# Patient Record
Sex: MALE | Race: WHITE | NOT HISPANIC OR LATINO | Employment: OTHER | URBAN - METROPOLITAN AREA
[De-identification: names, ages, dates, MRNs, and addresses within clinical notes are randomized per-mention and may not be internally consistent; named-entity substitution may affect disease eponyms.]

---

## 2020-11-23 ENCOUNTER — OFFICE VISIT (OUTPATIENT)
Dept: URGENT CARE | Facility: CLINIC | Age: 30
End: 2020-11-23
Payer: COMMERCIAL

## 2020-11-23 VITALS
WEIGHT: 185 LBS | RESPIRATION RATE: 20 BRPM | HEIGHT: 75 IN | HEART RATE: 74 BPM | TEMPERATURE: 97.6 F | SYSTOLIC BLOOD PRESSURE: 128 MMHG | OXYGEN SATURATION: 98 % | DIASTOLIC BLOOD PRESSURE: 74 MMHG | BODY MASS INDEX: 23 KG/M2

## 2020-11-23 DIAGNOSIS — L98.9 LESION OF THUMB: Primary | ICD-10-CM

## 2020-11-23 PROCEDURE — G0381 LEV 2 HOSP TYPE B ED VISIT: HCPCS | Performed by: NURSE PRACTITIONER

## 2020-11-30 DIAGNOSIS — L98.9 SKIN LESION OF HAND: Primary | ICD-10-CM

## 2020-12-02 ENCOUNTER — OFFICE VISIT (OUTPATIENT)
Dept: OBGYN CLINIC | Facility: MEDICAL CENTER | Age: 30
End: 2020-12-02

## 2020-12-02 VITALS — BODY MASS INDEX: 23 KG/M2 | TEMPERATURE: 98.7 F | WEIGHT: 185 LBS | HEIGHT: 75 IN

## 2020-12-02 DIAGNOSIS — L98.0 PYOGENIC GRANULOMA: ICD-10-CM

## 2020-12-02 PROCEDURE — 99244 OFF/OP CNSLTJ NEW/EST MOD 40: CPT | Performed by: ORTHOPAEDIC SURGERY

## 2020-12-02 PROCEDURE — 17250 CHEM CAUT OF GRANLTJ TISSUE: CPT | Performed by: ORTHOPAEDIC SURGERY

## 2020-12-09 ENCOUNTER — OFFICE VISIT (OUTPATIENT)
Dept: OBGYN CLINIC | Facility: MEDICAL CENTER | Age: 30
End: 2020-12-09

## 2020-12-09 VITALS — HEIGHT: 75 IN | BODY MASS INDEX: 23 KG/M2 | TEMPERATURE: 96.9 F | WEIGHT: 185 LBS

## 2020-12-09 DIAGNOSIS — L98.0 PYOGENIC GRANULOMA: Primary | ICD-10-CM

## 2020-12-09 PROCEDURE — 99213 OFFICE O/P EST LOW 20 MIN: CPT | Performed by: ORTHOPAEDIC SURGERY

## 2020-12-09 NOTE — PROGRESS NOTES
Chief Complaint     Right thumb lesion      History of Present Illness     Danielle Cisneros is a 31 y o  RHD male who presents for follow-up evaluation of pyogenic granuloma of the right thumb  He was last seen in regards to this issue on 12/2/2020 at which time he received a silver nitrate treatment  He states that the soft tissue surrounding the granuloma has settled father, leaving the lesion itself more prominent than it was prior to the initial treatment  He has been wearing gloves more regularly at work to avoid irritation of the lesion  He denies any recent numbness, tingling, or mechanical symptoms  He does note that he initially had significant bruising/discoloration of the skin following the initial silver nitrate treatment, but this has also subsided in the past few days  History reviewed  No pertinent past medical history  Past Surgical History:   Procedure Laterality Date    APPENDECTOMY         No Known Allergies    No current outpatient medications on file prior to visit  No current facility-administered medications on file prior to visit  Social History     Tobacco Use    Smoking status: Never Smoker    Smokeless tobacco: Never Used   Substance Use Topics    Alcohol use: Not on file    Drug use: Not on file       History reviewed  No pertinent family history  Review of Systems     As stated in the HPI  All other systems were reviewed and are negative  Physical Exam     Temp (!) 96 9 °F (36 1 °C)   Ht 6' 3" (1 905 m)   Wt 83 9 kg (185 lb)   BMI 23 12 kg/m²     GENERAL: This is a well-developed, well-nourished, age-appropriate patient in no acute distress  The patient is alert and oriented x3  Pleasant and cooperative  Eyes: Anicteric sclerae  Extraocular movements appear intact  HENT: Nares are patent with no drainage  Lungs: There is equal chest rise on inspection  Breathing is non-labored with no audible wheezing  Cardiovascular: No cyanosis   No upper extremity lymphadema  Skin: Skin is warm to touch  No obvious skin lesions or rashes other than described below  Neurologic: No ataxia  Psychiatric: Mood and affect are appropriate  Ortho exam:    Left thumb -   Observable raised circular lesion 1 mm diameter x 1 mm tall  Mild darkening in surrounding skin  Reddened/pink appearance of lesion  Full active passive ROM  5/5 Motor to the APB, FDI, FDP2, FDP5, EDC  Sensation intact to light touch in the median, radial, and ulnar nerve distribution  2+ distal radial pulse brisk capillary refill to fingers      Data Review     Results Reviewed     None         Imaging:  No new imaging reviewed this visit    Assessment and Plan      Diagnoses and all orders for this visit:    Pyogenic granuloma right thumb       Discussed with patient he seems to be improving  Advised patient that he will likely see benefit from an additional treatment with silver nitrate  Patient is amenable to this plan  Silver nitrate application was performed at time of visit without difficulty  Patient is instructed to leave dressing on for 48 hours, and then change dressing daily  Discussed with patient that if he does not see resolution of lesion following this application, then we will consider surgical excision under local anesthesia, preferably before the new year      Follow Up:  1-2 weeks  To Do Next Visit:  Reexamination of current issue    PROCEDURES PERFORMED:  Procedures  No Procedures performed today     Scribe Attestation    I,:  Clemente Spangler am acting as a scribe while in the presence of the attending physician :       I,:  Jesse Bernard MD personally performed the services described in this documentation    as scribed in my presence :

## 2020-12-23 ENCOUNTER — OFFICE VISIT (OUTPATIENT)
Dept: OBGYN CLINIC | Facility: MEDICAL CENTER | Age: 30
End: 2020-12-23

## 2020-12-23 VITALS
HEIGHT: 75 IN | DIASTOLIC BLOOD PRESSURE: 82 MMHG | BODY MASS INDEX: 23 KG/M2 | HEART RATE: 65 BPM | SYSTOLIC BLOOD PRESSURE: 127 MMHG | WEIGHT: 185 LBS | TEMPERATURE: 98.1 F

## 2020-12-23 DIAGNOSIS — L98.0 PYOGENIC GRANULOMA OF SKIN: Primary | ICD-10-CM

## 2020-12-23 PROCEDURE — 99214 OFFICE O/P EST MOD 30 MIN: CPT | Performed by: ORTHOPAEDIC SURGERY

## 2020-12-23 NOTE — H&P (VIEW-ONLY)
Chief Complaint     Lesion of left thumb    History of Present Illness     James Cisneros is a 31 y o  male who presents for follow-up evaluation of pyogenic granuloma of the left thumb middle phalanx  He was last seen in regards to this issue on 12/9/2020 at which time he received his 2nd silver nitrate treatment  He reports that he had darkening of the surrounding skin tissue that persisted for approximately a week and then fell off, but the granuloma did not changed significantly in appearance following 2nd treatment  He also notes that in the last 2-3 days the lesion has become painful and tender to the touch, which was a change from its previous presentation  He denies any associated numbness or tingling  History reviewed  No pertinent past medical history  Past Surgical History:   Procedure Laterality Date    APPENDECTOMY         No Known Allergies    No current outpatient medications on file prior to visit  No current facility-administered medications on file prior to visit  Social History     Tobacco Use    Smoking status: Never Smoker    Smokeless tobacco: Never Used   Substance Use Topics    Alcohol use: Not on file    Drug use: Not on file       History reviewed  No pertinent family history  Review of Systems     As stated in the HPI  All other systems were reviewed and are negative  Physical Exam     /82   Pulse 65   Temp 98 1 °F (36 7 °C)   Ht 6' 3" (1 905 m)   Wt 83 9 kg (185 lb)   BMI 23 12 kg/m²     GENERAL: This is a well-developed, well-nourished, age-appropriate patient in no acute distress  The patient is alert and oriented x3  Pleasant and cooperative  Eyes: Anicteric sclerae  Extraocular movements appear intact  HENT: Nares are patent with no drainage  Lungs: There is equal chest rise on inspection  Breathing is non-labored with no audible wheezing  Cardiovascular: No cyanosis  No upper extremity lymphadema  Skin: Skin is warm to touch   No obvious skin lesions or rashes other than described below  Neurologic: No ataxia  Psychiatric: Mood and affect are appropriate  Ortho exam:  Left thumb -    Raised pedunculated circular lesion on the palmar ulnar aspect near the base of the thumb  About 3 mm x 3 mm  Surrounding skin tissue is moist  Reddened/pink appearance of lesion  Friable tissue at the cap  Full active passive ROM of the thumb  5/5 Motor to the APB, FDI, FDP2, FDP5, EDC  Sensation intact to light touch in the median, radial, and ulnar nerve distribution  2+ distal radial pulse brisk capillary refill to fingers    Data Review     Results Reviewed     None           Imaging:  No new imaging reviewed this visit    Assessment and Plan      Diagnoses and all orders for this visit:    Pyogenic granuloma of skin       · 24-year-old male with pyogenic granuloma of the left thumb refractory to silver nitrate  Discussed that we could excise the mass at this point and if there is any tissue defect, we could perform a full-thickness skin grafting typically taken from the hypothenar eminence  Reviewed procedure, prognosis, benefits and risks with patient at time of visit  Possible recurrence as well as possible neurovascular injury  · Detailed consent was reviewed and obtained at time of visit    · Also discussed sending the mass for pathology to ensure that the mass is a pyogenic granuloma        Follow Up: 10-14 days post op    To Do Next Visit: pos-op evaluation, wound check and skin removal    PROCEDURES PERFORMED:  Procedures  No Procedures performed today     Scribe Attestation    I,:  Naman Leos am acting as a scribe while in the presence of the attending physician :       I,:  Valerie Jean-Baptiste MD personally performed the services described in this documentation    as scribed in my presence :

## 2020-12-29 ENCOUNTER — TELEPHONE (OUTPATIENT)
Dept: OBGYN CLINIC | Facility: HOSPITAL | Age: 30
End: 2020-12-29

## 2020-12-29 NOTE — TELEPHONE ENCOUNTER
Patient sees Dr Radha Hinton  Patient is calling in to get the CPT code for his surgery so he is able to contact  to see how much his surgery is going to cost him, he is statin that he is scheduled for surgery on 12/31 and is asking for a call back as soon as possible        Call back# [phone]

## 2020-12-30 RX ORDER — CHOLECALCIFEROL (VITAMIN D3) 125 MCG
2000 CAPSULE ORAL DAILY
COMMUNITY

## 2020-12-30 RX ORDER — ACETAMINOPHEN 500 MG
500-1000 TABLET ORAL EVERY 6 HOURS PRN
COMMUNITY

## 2020-12-30 NOTE — PRE-PROCEDURE INSTRUCTIONS
Pre-Surgery Instructions:   Medication Instructions    acetaminophen (TYLENOL) 500 mg tablet Instructed patient per Anesthesia Guidelines   Ascorbic Acid (VITAMIN C PO) Instructed patient per Anesthesia Guidelines   Cholecalciferol (Vitamin D3) 50 MCG (2000 UT) TABS Instructed patient per Anesthesia Guidelines   L-Glutathione CARMELINA Instructed patient per Anesthesia Guidelines   NON FORMULARY Instructed patient per Anesthesia Guidelines   Omega-3 Fatty Acids (OMEGA 3 PO) Instructed patient per Anesthesia Guidelines     Instructed to take tylenol if needed am of surgery per anesthesia guidelines

## 2020-12-31 ENCOUNTER — HOSPITAL ENCOUNTER (OUTPATIENT)
Facility: HOSPITAL | Age: 30
Setting detail: OUTPATIENT SURGERY
Discharge: HOME | End: 2020-12-31
Attending: ORTHOPAEDIC SURGERY | Admitting: ORTHOPAEDIC SURGERY

## 2020-12-31 VITALS
BODY MASS INDEX: 23 KG/M2 | SYSTOLIC BLOOD PRESSURE: 127 MMHG | RESPIRATION RATE: 20 BRPM | WEIGHT: 185 LBS | DIASTOLIC BLOOD PRESSURE: 85 MMHG | HEIGHT: 75 IN | HEART RATE: 64 BPM | OXYGEN SATURATION: 99 % | TEMPERATURE: 97.8 F

## 2020-12-31 PROCEDURE — 11420 EXC H-F-NK-SP B9+MARG 0.5/<: CPT | Performed by: PHYSICIAN ASSISTANT

## 2020-12-31 PROCEDURE — 11420 EXC H-F-NK-SP B9+MARG 0.5/<: CPT | Performed by: ORTHOPAEDIC SURGERY

## 2020-12-31 RX ORDER — IBUPROFEN 600 MG/1
600 TABLET ORAL EVERY 6 HOURS PRN
COMMUNITY

## 2020-12-31 RX ORDER — LIDOCAINE HYDROCHLORIDE AND EPINEPHRINE 10; 10 MG/ML; UG/ML
INJECTION, SOLUTION INFILTRATION; PERINEURAL AS NEEDED
Status: DISCONTINUED | OUTPATIENT
Start: 2020-12-31 | End: 2020-12-31 | Stop reason: HOSPADM

## 2020-12-31 RX ORDER — SODIUM CHLORIDE 9 MG/ML
125 INJECTION, SOLUTION INTRAVENOUS CONTINUOUS
Status: DISCONTINUED | OUTPATIENT
Start: 2020-12-31 | End: 2020-12-31 | Stop reason: HOSPADM

## 2020-12-31 RX ORDER — MAGNESIUM HYDROXIDE 1200 MG/15ML
LIQUID ORAL AS NEEDED
Status: DISCONTINUED | OUTPATIENT
Start: 2020-12-31 | End: 2020-12-31 | Stop reason: HOSPADM

## 2020-12-31 NOTE — OP NOTE
DATE OF SURGERY: 12/31/2020    SURGEON: Tiffanie Bryant MD    PREOPERATIVE DIAGNOSIS: Left thumb palmar pyogenic granuloma 0 5x0 5 cm    POSTOPERATIVE DIAGNOSIS:  Same    PROCEDURE:  Excision left thumb pyogenic granuloma 0 5 cm x 0 5 cm    IMPLANTS: * No implants in log *     ASSISTANTS: TIFFANIE Lu    ANESTHESIA: IV Sedation with Anesthesia    ESTIMATED BLOOD LOSS: Minimal     INTRAVENOUS FLUIDS: Per anesthesia    URINE OUTPUT:  No Howell    TOURNIQUET TIME: * No tourniquets in log *      COMPLICATIONS:  None    ANTIBIOTICS:  None    SPECIMENS: * No specimens in log *         INDICATIONS: Levi Cisneros is a 31y o  year old male who sustained the above conditions  The indications for operative intervention were a pyogenic granuloma that was refractory to 2 treatments of silver nitrate  The alternatives to operative intervention included nonoperative care  The risks of the operative procedure were discussed in detail with the patient including but not limited to the risks of anesthesia, infection, injury to blood vessel/nerve, pain, stiffness, changes in sensation, and the need for repeat surgery  We also discussed her risk of recurrence as well  The patient understood these risks and alternatives and elected to proceed with surgery  DESCRIPTION OF PROCEDURE: The patient was seen in the preoperative holding area and identification was performed as per the institution's protocol  10 cc of 1% lidocaine with epinephrine were infused into both the operative site as well as at the hypothenar eminence in anticipation of potentially needing as full-thickness skin graft  The patient was then brought to the operating room, a briefing was held and prophylactic antibiotics were not given  A tourniquet was not used The site was pre-scrubbed with chlorhexidine and prepped with ChloraPrep and draped in the usual sterile fashion         Following a timeout procedure, the top of the granuloma was excised with Nae Grates scissors  The stalk was identified as coming out through the skin  The skin edges were extended proximally and distally about 0 5 cm  Dissection was carried sharply down through skin and subcutaneous tissue taking care to protect any neurovascular structures encountered  The stalk was noted to be emanating from a vein  This was cauterized with bipolar electrocautery  A 2nd look was performed and there was no remaining tissue that appeared granulomatous  The wounds were copiously irrigated and closed in layers including 4-0 nylon  Sterile dressing was applied  All sharps and sponge counts were correct and there were no complications  I attest that I, Clive Lisa, was present and scrubbed for the entirety of the procedure  A physician assistant was required during the procedure for retraction, tissue handling, dissection and suturing  The patient was awoken from anesthesia in good condition and taken to the PACU  PLAN: The patient will keep his dressing on until follow-up    We will see him back at that point and stitches will be removed

## 2020-12-31 NOTE — DISCHARGE INSTRUCTIONS
[facility] Pi[facility] - Dr Nolvia Canela (Orthopedic Surgery)    Follow-up Appointments   Please call to set up/confirm your first postoperative visit with Dr Amirah Nunn in 10-14 days    Dressing and [address] A dressing has been placed on your hand/arm to keep the incisions clean  Keep your dressing clean and dry  o Do not remove the surgical dressing/splint  It will be removed at your first postoperative office visit with the doctor or therapist     Henrik Murillo a plastic bag over your dressing/splint whenever you take a shower or bath until you are allowed to remove it   Swelling is normal after surgery  Elevate your hand/arm so the surgical site is above your heart to decrease the swelling  Swelling is like water, it runs downhill  This is especially important for the first 72 hours after surgery  o The best way to elevate your hand/arm is with your fingers pointing towards the ceiling and your hand/arm above the level of the heart   o You can use pillows to help prop your hand/arm up when sitting or lying down   If you are experiencing pain, be sure you are elevating your hand/arm as often as possible   Apply an ice pack over your dressing/splint for 20 minutes of every hour for the first 3 days when you are awake  This can help to reduce swelling and inflammation  Be sure the ice pack is waterproof so it does not leak on the dressing/splint  A simple ice pack can be made by adding ten cubes and a small amount of water in a small zip-lock bag  Seal this small bag tightly  Place this small bag in a larger zip lock bag  Apply to the area in pain   If the dressing feels too tight in spite of elevation, loosen the outer wrap but do not remove the entire dressing     If you have exposed pins/wires, take clean gauze or a cotton tip applicator (like a Q-tip), get it wet in clean warm water mixed with non-scented hand soap (like Charo, [place], Dial, etc ), and gently wipe around the base of the pin where it comes through the skin once a day  Do not use water from a well to clean as this has bacteria in it and can contribute to infections  ACTIVITIES:  [facility] and straighten the parts of your hand, wrist, elbow, and shoulder that are not included in your surgical dressing or splint  Do this at least 6 times a day, as this will help decrease swelling and speed up your recovery  This includes your fingers  See the instructions listed below for finger motion  THIS IS VERY IMPORTANT FOR YOUR RECOVERY! POSTOPERATIVE CARE/CONCERNS:  Christinalizeth Andrade You may experience some temporary numbness in your fingers   You should have very little to no bleeding on your dressing   Notify the office (see contact info at bottom of page) for any of the following:  o Excessive pain not relieved by rest, elevation, and pain medications  o Feeling that the dressing is too tight in spite of adequately elevating hand/arm  o Active bleeding through the dressing  o Drainage from the wound site or pin sites  o Foul odor from the dressing/wound  o Temperature greater that 101? F or chills  o Blue or excessively cold fingertips  o Numbness of the fingertips that does not improve in spite of adequately elevating hand/arm    PAIN MEDICATION:   Pain is a normal part of the recovery after surgery  The pain medication provided to you will help to decrease the discomfort but will not completely eliminate the pain   A prescription for a narcotic pain medicine (oxycodone or hydrocodone) and anti-inflammatory (naproxen) were called in to your pharmacy  Please take the anti-inflammatory medication AND over-the-counter acetaminophen (Tylenol) regularly  The instructions will be listed on the bottles  The narcotic pain medicine should be used for pain that is not controlled by these other medications and only for the first few days after surgery   The narcotic is HIGHLY ADDICTIVE and has many side effects such as causing constipation, dizziness, confusion, decreased breathing and more  It is safe to take for a short period of time after surgery  It is almost never prescribed for longer than a few weeks and never after one month for elective surgeries   Do not take narcotic or anti-inflammatories on an empty stomach   It is illegal to drive while taking narcotic pain medication   Your pain should decrease over the first few days after surgery which will allow you to take less pain medicine, increase the time between doses of medication, or stop taking all pain medicine        OFFICE CONTACT NUMBERS   Please call [phone] with any questions about appointments or any medical concerns

## 2021-01-04 ENCOUNTER — TELEPHONE (OUTPATIENT)
Dept: OBGYN CLINIC | Facility: HOSPITAL | Age: 31
End: 2021-01-04

## 2021-01-04 NOTE — TELEPHONE ENCOUNTER
I contacted him back and discussed dressing changes as needed  We went through all of the steps for this  He will be moving his appointment to next Thursday  Diego Seals and Matti Yusuf can you please reach out to him to confirm    Appointment on 01/14/2020

## 2021-01-04 NOTE — TELEPHONE ENCOUNTER
Patient was going to move PO to next week per appt line note, however he left it there until call back, states his bandage will not make it till next week

## 2021-01-08 NOTE — TELEPHONE ENCOUNTER
Patient took off and rebandaged l thumb  Observed that one stiatches came off and other two are very loose  Dark red around incision and very center is yellow puss colored but not oozing or bleeding  He took a picture and would like to send it in to see what you think he should do  Please call [phone] to advise    Thank you

## 2021-01-11 NOTE — TELEPHONE ENCOUNTER
I called him back and left a deidentified voicemail with instructions noting that the images showed progressive with wound healing by secondary intention with no evidence of infection  He is to complete daily cleaning in the shower with warm water and gentle soap but no scrubbing  Pat the incision dry  Place a tiny bit of Neosporin in the wound and then cover with gauze and Coban  Can continue to work    We will see him at his postop appointment

## 2021-01-11 NOTE — TELEPHONE ENCOUNTER
Please have him send through either the patient portal or to my patient protected email  Octavio Barney or Monica, can you please coordinate with the patient?

## 2021-01-11 NOTE — TELEPHONE ENCOUNTER
Spoke with patient  He does not have the patient portal, will be sending you the picture to your email shortly  Patient appointment date and time also confirmed

## 2021-01-14 ENCOUNTER — OFFICE VISIT (OUTPATIENT)
Dept: OBGYN CLINIC | Facility: MEDICAL CENTER | Age: 31
End: 2021-01-14

## 2021-01-14 VITALS
WEIGHT: 185 LBS | SYSTOLIC BLOOD PRESSURE: 118 MMHG | HEIGHT: 75 IN | DIASTOLIC BLOOD PRESSURE: 77 MMHG | HEART RATE: 74 BPM | BODY MASS INDEX: 23 KG/M2

## 2021-01-14 DIAGNOSIS — L98.0 PYOGENIC GRANULOMA OF SKIN: Primary | ICD-10-CM

## 2021-01-14 PROCEDURE — 99213 OFFICE O/P EST LOW 20 MIN: CPT | Performed by: ORTHOPAEDIC SURGERY

## 2021-01-14 NOTE — PROGRESS NOTES
Chief Complaint     ***      History of Present Illness     Charito Cisneros is a 31 y o  male ***          Past Medical History:   Diagnosis Date    Mass of skin of left thumb        Past Surgical History:   Procedure Laterality Date    APPENDECTOMY      LA EXC SKIN BENIG <0 5 CM TRUNK,ARM,LEG Left 12/31/2020    Procedure: Thumb mass excision;  Surgeon: Viri Monroy MD;  Location: [facility];  Service: Orthopedics    WISDOM TOOTH EXTRACTION         No Known Allergies    Current Outpatient Medications on File Prior to Visit   Medication Sig Dispense Refill    acetaminophen (TYLENOL) 500 mg tablet Take 500-1,000 mg by mouth every 6 (six) hours as needed for mild pain       Ascorbic Acid (VITAMIN C PO) Take 1,000 mg by mouth daily      Cholecalciferol (Vitamin D3) 50 MCG (2000 UT) TABS Take 2,000 Units by mouth daily      ibuprofen (MOTRIN) 600 mg tablet Take 600 mg by mouth every 6 (six) hours as needed for mild pain      L-Glutathione CARMELINA Take by mouth daily       NON FORMULARY Systemic formula OTC daily      Omega-3 Fatty Acids (OMEGA 3 PO) Take by mouth daily        No current facility-administered medications on file prior to visit  Social History     Tobacco Use    Smoking status: Never Smoker    Smokeless tobacco: Never Used   Substance Use Topics    Alcohol use: Not Currently    Drug use: Not Currently       History reviewed  No pertinent family history  Review of Systems     As stated in the HPI  All other systems were reviewed and are negative  Physical Exam     /77   Pulse 74   Ht 6' 3" (1 905 m)   Wt 83 9 kg (185 lb)   BMI 23 12 kg/m²     GENERAL: This is a well-developed, well-nourished, age-appropriate patient in no acute distress  The patient is alert and oriented x3  Pleasant and cooperative  Eyes: Anicteric sclerae  Extraocular movements appear intact  HENT: Nares are patent with no drainage  Lungs: There is equal chest rise on inspection   Breathing is non-labored with no audible wheezing  Cardiovascular: No cyanosis  No upper extremity lymphadema  Skin: Skin is warm to touch  No obvious skin lesions or rashes other than described below  Neurologic: No ataxia  Psychiatric: Mood and affect are appropriate  Good refill     Data Review     Results Reviewed     None             Imaging:  ***    Assessment and Plan      There are no diagnoses linked to this encounter  Continue soap and water   May coband as needed      Follow Up: ***    To Do Next Visit: ***    PROCEDURES PERFORMED:  Procedures  {Was [facility] done:70414::"No Procedures performed today"}    Scribe Attestation    I,:   am acting as a scribe while in the presence of the attending physician :       I,:   personally performed the services described in this documentation    as scribed in my presence :

## 2021-01-14 NOTE — PROGRESS NOTES
Chief Complaint     Left thumb mass      History of Present Illness     Tegan Cisneros is a 31 y o  male  Presenting for postoperative visit following excision of pyogenic granuloma  He reports that he is doing well  He did have some concern regarding his incision and photos were sent via e-mail  Denies drainage  Has been completing dressing changes and soaks  Past Medical History:   Diagnosis Date    Mass of skin of left thumb        Past Surgical History:   Procedure Laterality Date    APPENDECTOMY      GA EXC SKIN BENIG <0 5 CM TRUNK,ARM,LEG Left 12/31/2020    Procedure: Thumb mass excision;  Surgeon: Brandi Caldwell MD;  Location: [facility];  Service: Orthopedics    WISDOM TOOTH EXTRACTION         No Known Allergies    Current Outpatient Medications on File Prior to Visit   Medication Sig Dispense Refill    acetaminophen (TYLENOL) 500 mg tablet Take 500-1,000 mg by mouth every 6 (six) hours as needed for mild pain       Ascorbic Acid (VITAMIN C PO) Take 1,000 mg by mouth daily      Cholecalciferol (Vitamin D3) 50 MCG (2000 UT) TABS Take 2,000 Units by mouth daily      ibuprofen (MOTRIN) 600 mg tablet Take 600 mg by mouth every 6 (six) hours as needed for mild pain      L-Glutathione CARMELINA Take by mouth daily       NON FORMULARY Systemic formula OTC daily      Omega-3 Fatty Acids (OMEGA 3 PO) Take by mouth daily        No current facility-administered medications on file prior to visit  Social History     Tobacco Use    Smoking status: Never Smoker    Smokeless tobacco: Never Used   Substance Use Topics    Alcohol use: Not Currently    Drug use: Not Currently       History reviewed  No pertinent family history  Review of Systems     As stated in the HPI  All other systems were reviewed and are negative        Physical Exam     /77   Pulse 74   Ht 6' 3" (1 905 m)   Wt 83 9 kg (185 lb)   BMI 23 12 kg/m²     GENERAL: This is a well-developed, well-nourished, age-appropriate patient in no acute distress  The patient is alert and oriented x3  Pleasant and cooperative  Eyes: Anicteric sclerae  Extraocular movements appear intact  HENT: Nares are patent with no drainage  Lungs: There is equal chest rise on inspection  Breathing is non-labored with no audible wheezing  Cardiovascular: No cyanosis  No upper extremity lymphadema  Skin: Skin is warm to touch  No obvious skin lesions or rashes other than described below  Neurologic: No ataxia  Psychiatric: Mood and affect are appropriate  Left thumb:  Incision clean, dry and intact  No fluctuance, purulance or drainage  No swelling, ecchymosis or erythema  Healing eschar at center of wound  Full motion, full opposition  No tenerness  5/5 Motor to the APB, FDI, FDP2, FDP5, EDC  Sensation intact to light touch in the median, radial, and ulnar nerve distribution  Brisk capillary refill noted in all digits  Palpable distal radial pulse            Data Review     Results Reviewed     None             Imaging:  None today    Assessment and Plan      Diagnoses and all orders for this visit:    Pyogenic granuloma of skin             31 y o  male S/P pyogenic granuloma excision  Incision looks improved from photos that were emailed  He may continue to use the hand for normal activities  He may apply Coban to the thumb as needed such as during work  I explained to the patient that there is a very low chance of this granuloma returning although if it does he should certainly follow up  Otherwise he does not need any future appointments        Follow Up: PRN    To Do Next Visit: n/a    PROCEDURES PERFORMED:    No Procedures performed today    Scribe Attestation    I,:  Pierre Alvarez MA am acting as a scribe while in the presence of the attending physician :       I,:  Genaro Daniels MD personally performed the services described in this documentation    as scribed in my presence :

## 2021-03-27 ENCOUNTER — OFFICE VISIT (OUTPATIENT)
Dept: URGENT CARE | Facility: MEDICAL CENTER | Age: 31
End: 2021-03-27

## 2021-03-27 VITALS
SYSTOLIC BLOOD PRESSURE: 133 MMHG | RESPIRATION RATE: 16 BRPM | HEART RATE: 78 BPM | WEIGHT: 187 LBS | DIASTOLIC BLOOD PRESSURE: 86 MMHG | HEIGHT: 74 IN | TEMPERATURE: 97.5 F | OXYGEN SATURATION: 100 % | BODY MASS INDEX: 24 KG/M2

## 2021-03-27 DIAGNOSIS — T14.8XXA SKIN AVULSION: Primary | ICD-10-CM

## 2021-03-27 PROCEDURE — 99213 OFFICE O/P EST LOW 20 MIN: CPT | Performed by: FAMILY MEDICINE

## 2021-03-28 NOTE — PATIENT INSTRUCTIONS
I apply Gelfoam to the right ring finger  Advised patient to change dressing daily  Patient given outpatient prescription for Tdap  Vaccine    Skin Avulsion   WHAT YOU NEED TO KNOW:   Skin avulsion is a wound that happens when skin is torn from your body during an accident or other injury  The torn skin may be lost or too damaged to be repaired, and it must be removed  A wound of this type cannot be stitched closed because there is tissue missing  Avulsion wounds are usually bigger and have more scars because of the missing tissue  DISCHARGE INSTRUCTIONS:   Medicines:   · Antibiotic ointment:  Your healthcare provider may tell you to gently rub a topical antibiotic ointment on your wound  This will help prevent an infection and help your wound heal faster  · Pain medicine: You may be given medicine to take away or decrease pain  Do not wait until the pain is severe before you take your medicine  · NSAIDs , such as ibuprofen, help decrease swelling, pain, and fever  This medicine is available with or without a doctor's order  NSAIDs can cause stomach bleeding or kidney problems in certain people  If you take blood thinner medicine, always ask if NSAIDs are safe for you  Always read the medicine label and follow directions  Do not give these medicines to children under 10months of age without direction from your child's healthcare provider  · Take your medicine as directed  Contact your healthcare provider if you think your medicine is not helping or if you have side effects  Tell him of her if you are allergic to any medicine  Keep a list of the medicines, vitamins, and herbs you take  Include the amounts, and when and why you take them  Bring the list or the pill bottles to follow-up visits  Carry your medicine list with you in case of an emergency  Care for your wound:  Avulsion wounds may take longer to heal because they cannot be closed with tape or stitches   Keep your wound clean and protected to prevent infection and speed healing  · Clean your wound:  Wash your hands with soap and water before and after you care for your wound  You may be able to use a soft cloth to gently clean the wound after the first 24 to 48 hours  After that, gently clean the wound once or twice a day with cool water  Do not soak your wound  Use soap to clean around the wound, but try not to get any on the wound itself  Do not use alcohol or hydrogen peroxide to clean your wound unless you are directed to  Gently pat the area dry and reapply the bandage as directed  · Elevate your wound:  Prop your injured area on pillows to raise it above the level of your heart  This will help reduce pain and swelling  Do this for 30 minutes at a time, as often as you can  · Bandage your wound:  Bandages keep your wound clean, dry, and protected from infection  They may also prevent swelling  Use a bandage that does not stick to your wound, and has a spongy layer to absorb fluids  Leave your bandage on as long as directed  Ask your healthcare provider when and how to change your bandage  Do not wrap the bandage too tightly  This could cut off blood flow and cause more injury  · Use cool compresses:  Wet a washcloth or towel with cool water and hold it on your wound as directed  Ask how often to apply the compress and for how long each time  · Reduce scarring:  Avoid direct sunlight on your wound  Sunlight may burn or change the color of the new skin over your wound  Use sunscreen (SPF 30 or higher) on the new skin for at least 1 year after it heals  Support for leg and arm wounds: You may need to use crutches if the wound is on your leg  You may need to use a sling if the wound is on your arm  Crutches and slings help protect the injured area, prevent further injury, and heal the area in the right position  Follow up with your healthcare provider within 2 days or as directed:   If you have stitches, ask when to return to have them removed  Write down your questions so you remember to ask them during your visits  Contact your healthcare provider if:   · You have new pain, or it gets worse  · You have trouble moving the injured body area  · Your wound splits open or does not seem to be healing  Return to the emergency department if:   · You have a fever  · You have painful swelling, redness, or warmth around your wound  · Your wound is red and there are red streaks on your skin starting at your wound and moving upward  · Your wound is draining pus  · You have heavy bleeding or bleeding that does not stop after 10 minutes of holding firm, direct pressure over the wound  · You feel like there is an object stuck in your wound  © Copyright [address] Information is for End User's use only and may not be sold, redistributed or otherwise used for commercial purposes  All illustrations and images included in CareNotes® are the copyrighted property of A D A M , Inc  or [facility]   The above information is an  only  It is not intended as medical advice for individual conditions or treatments  Talk to your doctor, nurse or pharmacist before following any medical regimen to see if it is safe and effective for you

## 2021-03-28 NOTE — PROGRESS NOTES
[facility]        NAME: Rosana Cisneros is a 31 y o  male  : 1990    MRN: [id number]  DATE: 2021  TIME: 8:30 PM    Assessment and Plan   Skin avulsion [T14  8XXA]  1  Skin avulsion  CANCELED: TDAP Vaccine greater than or equal to 6yo         Patient Instructions       Follow up with PCP in 3-5 days  Proceed to  ER if symptoms worsen  Chief Complaint     Chief Complaint   Patient presents with    Fingernail Avulsion     Patient presents with a right ring finger laceration that happened this afternoon when he was using a new utility knife  He reports that he took off a chunk of his skin rather than slicing it  He is due for TDap, but wants to "shop around" for one  History of Present Illness        29-year-old male here today because he cut his right ring finger with a utility knife approximately 1-2 hours before arriving at urgent care  He was able to clean the wound out apply pressure before arriving  At urgent care  Patient not sure with the last time he received a tetanus shot      Review of Systems   Review of Systems   Skin: Positive for wound           Current Medications       Current Outpatient Medications:     acetaminophen (TYLENOL) 500 mg tablet, Take 500-1,000 mg by mouth every 6 (six) hours as needed for mild pain , Disp: , Rfl:     Ascorbic Acid (VITAMIN C PO), Take 1,000 mg by mouth daily, Disp: , Rfl:     Cholecalciferol (Vitamin D3) 50 MCG (2000 UT) TABS, Take 2,000 Units by mouth daily, Disp: , Rfl:     L-Glutathione CARMELINA, Take by mouth daily , Disp: , Rfl:     NON FORMULARY, Systemic formula OTC daily, Disp: , Rfl:     Omega-3 Fatty Acids (OMEGA 3 PO), Take by mouth daily , Disp: , Rfl:     ibuprofen (MOTRIN) 600 mg tablet, Take 600 mg by mouth every 6 (six) hours as needed for mild pain, Disp: , Rfl:     Current Allergies     Allergies as of 2021    (No Known Allergies)            The following portions of the patient's history were reviewed and updated as appropriate: allergies, current medications, past family history, past medical history, past social history, past surgical history and problem list      Past Medical History:   Diagnosis Date    Mass of skin of left thumb        Past Surgical History:   Procedure Laterality Date    APPENDECTOMY      NC EXC SKIN BENIG <0 5 CM TRUNK,ARM,LEG Left 12/31/2020    Procedure: Thumb mass excision;  Surgeon: Santi Painter MD;  Location: [facility];  Service: Orthopedics    WISDOM TOOTH EXTRACTION         No family history on file  Medications have been verified  Objective   /86   Pulse 78   Temp 97 5 °F (36 4 °C) (Tympanic)   Resp 16   Ht 6' 2" (1 88 m)   Wt 84 8 kg (187 lb)   SpO2 100%   BMI 24 01 kg/m²   No LMP for male patient  Physical Exam     Physical Exam  Vitals signs and nursing note reviewed  Constitutional:       Appearance: Normal appearance  Skin:     Comments: Right ring finger reveals superficial laceration distal lateral aspect it extends into the dermis  Not actively bleeding  A portion of the nail has also been avulsed  Patient otherwise exhibits tactile sensation distal to the injury  Neurological:      Mental Status: He is alert

## 2021-07-26 ENCOUNTER — OFFICE VISIT (OUTPATIENT)
Dept: OBGYN CLINIC | Facility: MEDICAL CENTER | Age: 31
End: 2021-07-26

## 2021-07-26 VITALS
SYSTOLIC BLOOD PRESSURE: 132 MMHG | HEART RATE: 80 BPM | WEIGHT: 190 LBS | BODY MASS INDEX: 24.38 KG/M2 | DIASTOLIC BLOOD PRESSURE: 83 MMHG | HEIGHT: 74 IN

## 2021-07-26 DIAGNOSIS — Z78.9 POOR TOLERANCE FOR AMBULATION: ICD-10-CM

## 2021-07-26 DIAGNOSIS — M25.461 EFFUSION OF RIGHT KNEE: ICD-10-CM

## 2021-07-26 DIAGNOSIS — M23.91 INTERNAL DERANGEMENT OF KNEE JOINT, RIGHT: ICD-10-CM

## 2021-07-26 DIAGNOSIS — Z91.81 STATUS POST FALL: ICD-10-CM

## 2021-07-26 DIAGNOSIS — M70.41 PREPATELLAR BURSITIS OF RIGHT KNEE: Primary | ICD-10-CM

## 2021-07-26 PROCEDURE — 99213 OFFICE O/P EST LOW 20 MIN: CPT | Performed by: ORTHOPAEDIC SURGERY

## 2021-07-26 NOTE — PROGRESS NOTES
Ortho Sports Medicine Knee New Patient Visit     Assesment:   36 y o  male right knee pre-patella bursitis    Plan:    Conservative treatment:    Can use warm compresses     Imaging:    Patient refused x-rays at this time due to being self pay    Injection:    No Injection planned at this time  Patient will return in 1 week for aspiration of pre patella burstis    Surgery:     No surgery is recommended at this point, continue with conservative treatment plan as noted  Follow up:    Return in about 1 week (around 8/2/2021) for Recheck  Chief Complaint   Patient presents with    Pain     History of Present Illness: The patient is a 36 y o  male whose occupation is unknown, referred to me by their primary care physician, seen in clinic for consultation of right knee pain  Pain is located anterior  The patient rates the pain as a 1/10  The pain has been present for a few weeks  The patient sustained an injury in May 2021  The mechanism of injury was While carrying a heavy load he landed directly onto the right knee  Patient states that the knee swelled up immediately then resolved  It more recently has gotten increasing more swollen  He denies any fever, chills, or heat coming from the swelling  The pain is characterized as dull, achy  The pain is present at all times  Patient states that he only has pain when kneeling directly on the right knee  Pain is improved by rest  Pain is aggravated by kneeling  Symptoms include swelling  The patient has tried rest         Knee Surgical History:  None    Past Medical, Social and Family History:  Past Medical History:   Diagnosis Date    Mass of skin of left thumb      Past Surgical History:   Procedure Laterality Date    APPENDECTOMY      SD EXC SKIN BENIG <0 5 CM TRUNK,ARM,LEG Left 12/31/2020    Procedure: Thumb mass excision;  Surgeon:  Severiano Spears MD;  [facility]: [facility];  Service: Orthopedics    WISDOM TOOTH EXTRACTION       No Known Allergies  Current Outpatient Medications on File Prior to Visit   Medication Sig Dispense Refill    acetaminophen (TYLENOL) 500 mg tablet Take 500-1,000 mg by mouth every 6 (six) hours as needed for mild pain       Ascorbic Acid (VITAMIN C PO) Take 1,000 mg by mouth daily      Cholecalciferol (Vitamin D3) 50 MCG (2000 UT) TABS Take 2,000 Units by mouth daily      ibuprofen (MOTRIN) 600 mg tablet Take 600 mg by mouth every 6 (six) hours as needed for mild pain      L-Glutathione CARMELINA Take by mouth daily       NON FORMULARY Systemic formula OTC daily      Omega-3 Fatty Acids (OMEGA 3 PO) Take by mouth daily        No current facility-administered medications on file prior to visit  Social History     Socioeconomic History    Marital status: /Civil Union     Spouse name: Not on file    Number of children: Not on file    Years of education: Not on file    Highest education level: Not on file   Occupational History    Not on file   Tobacco Use    Smoking status: Never Smoker    Smokeless tobacco: Never Used   Vaping Use    Vaping Use: Never used   Substance and Sexual Activity    Alcohol use: Not Currently    Drug use: Not Currently    Sexual activity: Not on file   Other Topics Concern    Not on file   Social History Narrative    Not on file     Social Determinants of Health     Financial Resource Strain:     Difficulty of Paying Living Expenses:    Food Insecurity:     Worried About Running Out of Food in the Last Year:     [address] in the Last Year:    Transportation Needs:     Lack of Transportation (Medical):      Lack of Transportation (Non-Medical):    Physical Activity:     Days of Exercise per Week:     Minutes of Exercise per Session:    Stress:     Feeling of Stress :    Social Connections:     Frequency of Communication with Friends and Family:     Frequency of Social Gatherings with Friends and Family:     Attends Bahai Services:     Active Member of Clubs or Organizations:     Attends Club or Organization Meetings:     Marital Status:    Intimate Partner Violence:     Fear of Current or Ex-Partner:     Emotionally Abused:     Physically Abused:     Sexually Abused:      I have reviewed the past medical, surgical, social and family history, medications and allergies as documented in the EMR  Review of systems: ROS is negative other than that noted in the HPI  Constitutional: Negative for fatigue and fever  HENT: Negative for sore throat  Respiratory: Negative for shortness of breath  Cardiovascular: Negative for chest pain  Gastrointestinal: Negative for abdominal pain  Endocrine: Negative for cold intolerance and heat intolerance  Genitourinary: Negative for flank pain  Musculoskeletal: Negative for back pain  Skin: Negative for rash  Allergic/Immunologic: Negative for immunocompromised state  Neurological: Negative for dizziness  Psychiatric/Behavioral: Negative for agitation  Physical Exam:    Blood pressure 132/83, pulse 80, height 6' 2" (1 88 m), weight 86 2 kg (190 lb)  General/Constitutional: NAD, well developed, well nourished  HENT: Normocephalic, atraumatic  CV: Intact distal pulses, regular rate  Resp: No respiratory distress or labored breathing  Lymphatic: No lymphadenopathy palpated  Neuro: Alert and Oriented x 3, no focal deficits  Psych: Normal mood, normal affect, normal judgement, normal behavior  Skin: Warm, dry, no rashes, no erythema      Knee Exam (focused): RIGHT LEFT   ROM:   0-130 0-130   Palpation: Effusion large negative     MJL tenderness Negative Negative     LJL tenderness Negative Negative   Meniscus:  Don Negative Negative    Apley's Compression Negative Negative   Instability: Varus stable stable     Valgus stable stable   Special Tests: Lachman Negative Negative     Posterior drawer Negative Negative     Anterior drawer Negative Negative     Pivot shift not tested not tested     Dial not tested not tested   Patella: Palpation no tenderness no tenderness     Mobility 1/4 1/4     Apprehension Negative Negative   Other: Single leg 1/4 squat not tested not tested      LE NV Exam: +2 DP/PT pulses bilaterally  Sensation intact to light touch L2-S1 bilaterally     Bilateral hip ROM demonstrates no pain actively or passively    No calf tenderness to palpation bilaterally    Knee Imaging    No x-rays were taken today      Scribe Attestation    I,:  Maki Steward am acting as a scribe while in the presence of the attending physician :       I,:  Markell Carrillo DO personally performed the services described in this documentation    as scribed in my presence :

## 2021-08-02 ENCOUNTER — OFFICE VISIT (OUTPATIENT)
Dept: OBGYN CLINIC | Facility: MEDICAL CENTER | Age: 31
End: 2021-08-02

## 2021-08-02 VITALS
BODY MASS INDEX: 24.38 KG/M2 | DIASTOLIC BLOOD PRESSURE: 80 MMHG | HEIGHT: 74 IN | SYSTOLIC BLOOD PRESSURE: 121 MMHG | HEART RATE: 77 BPM | WEIGHT: 190 LBS

## 2021-08-02 DIAGNOSIS — M25.461 EFFUSION OF RIGHT PREPATELLAR BURSA: Primary | ICD-10-CM

## 2021-08-02 PROCEDURE — 99213 OFFICE O/P EST LOW 20 MIN: CPT | Performed by: ORTHOPAEDIC SURGERY

## 2021-08-02 PROCEDURE — 20610 DRAIN/INJ JOINT/BURSA W/O US: CPT | Performed by: ORTHOPAEDIC SURGERY

## 2021-08-02 NOTE — PROGRESS NOTES
Ortho Sports Medicine Knee Follow Up Visit     Assesment:     36 y o  male right knee prepatellar bursa s/p drainage 8/2/21     Plan:  Right knee prepatellar bursa drained today  Patient tolerated procedure well without complications  Plan for conservative management  Conservative management stressed  Plan for patient to follow up in 6-8 weeks  Conservative treatment:    Let pain guide gradual return activities  Imaging: All imaging from today was reviewed by myself and explained to the patient  No imaging was available for review today  Injection:      Right prepatellar aspiration completed 8/2/21       Surgery:     No surgery is recommended at this point, continue with conservative treatment plan as noted  Follow up:    Return in about 6 weeks (around 9/13/2021) for Right knee swelling   Chief Complaint   Patient presents with    Right Knee - Follow-up       History of Present Illness: The patient is returns for follow up of right prepatellar bursa  Since the prior visit, He reports no improvement  Reports worsening swelling since last seen  Denies pain  Denies catching, clicking, locking, popping sensation  Symptoms include swelling  The patient has tried rest, NSAIDS and bracing  Knee Surgical History:  None    Past Medical, Social and Family History:  Past Medical History:   Diagnosis Date    Mass of skin of left thumb      Past Surgical History:   Procedure Laterality Date    APPENDECTOMY      MA EXC SKIN BENIG <0 5 CM TRUNK,ARM,LEG Left 12/31/2020    Procedure: Thumb mass excision;  Surgeon:  Severiano Spears MD;  Location: [facility];  Service: Orthopedics    WISDOM TOOTH EXTRACTION       No Known Allergies  Current Outpatient Medications on File Prior to Visit   Medication Sig Dispense Refill    acetaminophen (TYLENOL) 500 mg tablet Take 500-1,000 mg by mouth every 6 (six) hours as needed for mild pain       Ascorbic Acid (VITAMIN C PO) Take 1,000 mg by mouth daily      Cholecalciferol (Vitamin D3) 50 MCG (2000 UT) TABS Take 2,000 Units by mouth daily      ibuprofen (MOTRIN) 600 mg tablet Take 600 mg by mouth every 6 (six) hours as needed for mild pain      L-Glutathione CARMELINA Take by mouth daily       NON FORMULARY Systemic formula OTC daily      Omega-3 Fatty Acids (OMEGA 3 PO) Take by mouth daily        No current facility-administered medications on file prior to visit  Social History     Socioeconomic History    Marital status: /Civil Union     Spouse name: Not on file    Number of children: Not on file    Years of education: Not on file    Highest education level: Not on file   Occupational History    Not on file   Tobacco Use    Smoking status: Never Smoker    Smokeless tobacco: Never Used   Vaping Use    Vaping Use: Never used   Substance and Sexual Activity    Alcohol use: Not Currently    Drug use: Not Currently    Sexual activity: Not on file   Other Topics Concern    Not on file   Social History Narrative    Not on file     Social Determinants of Health     Financial Resource Strain:     Difficulty of Paying Living Expenses:    Food Insecurity:     Worried About Running Out of Food in the Last Year:     [address] in the Last Year:    Transportation Needs:     Lack of Transportation (Medical):      Lack of Transportation (Non-Medical):    Physical Activity:     Days of Exercise per Week:     Minutes of Exercise per Session:    Stress:     Feeling of Stress :    Social Connections:     Frequency of Communication with Friends and Family:     Frequency of Social Gatherings with Friends and Family:     Attends [employer] Services:     Active Member of Clubs or Organizations:     Attends Club or Organization Meetings:     Marital Status:    Intimate Partner Violence:     Fear of Current or Ex-Partner:     Emotionally Abused:     Physically Abused:     Sexually Abused:          I have reviewed the past medical, surgical, social and family history, medications and allergies as documented in the EMR  Review of systems: ROS is negative other than that noted in the HPI  Constitutional: Negative for fatigue and fever  Physical Exam:    Blood pressure 121/80, pulse 77, height 6' 2" (1 88 m), weight 86 2 kg (190 lb)  General/Constitutional: NAD, well developed, well nourished  HENT: Normocephalic, atraumatic  CV: Intact distal pulses, regular rate  Resp: No respiratory distress or labored breathing  Lymphatic: No lymphadenopathy palpated  Neuro: Alert and Oriented x 3, no focal deficits  Psych: Normal mood, normal affect, normal judgement, normal behavior  Skin: Warm, dry, no rashes, no erythema      Knee Exam (focused): RIGHT LEFT   ROM:   0-130 0-130   Palpation: Effusion large negative     MJL tenderness Negative Not Applicable     LJL tenderness Negative Not Applicable   Meniscus: Don Not Applicable Not Applicable    Apley's Compression Not Applicable Not Applicable   Instability: Varus stable stable     Valgus stable stable   Special Tests: Lachman Not Applicable Not Applicable     Posterior drawer Not Applicable Not Applicable     Anterior drawer Not Applicable Not Applicable     Pivot shift not tested not tested     Dial not tested not tested   Patella: Palpation no tenderness no tenderness     Mobility <1/4 <1/4     Apprehension Not Applicable Not Applicable   Other: Single leg 1/4 squat not tested not tested           LE NV Exam: +2 DP/PT pulses bilaterally  Sensation intact to light touch L2-S1 bilaterally    No calf tenderness to palpation bilaterally      Knee Imaging    No imaging was performed today  Patient declined in the past as he is paying out of pocket         Scribe Attestation    I,:   am acting as a scribe while in the presence of the attending physician :       I,:   personally performed the services described in this documentation    as scribed in my presence : Large joint arthrocentesis: R prepatellar bursa  Universal Protocol:  Consent: Verbal consent obtained  Risks and benefits: risks, benefits and alternatives were discussed  Patient understanding: patient states understanding of the procedure being performed  Patient consent: the patient's understanding of the procedure matches consent given  Site marked: the operative site was marked  Radiology Images displayed and confirmed   If images not available, report reviewed: imaging studies available  Required items: required blood products, implants, devices, and special equipment available  Patient identity confirmed: verbally with patient    Supporting Documentation  Indications: joint swelling   Procedure Details  Location: knee - R prepatellar bursa  Preparation: Patient was prepped and draped in the usual sterile fashion  Needle size: 18 G  Ultrasound guidance: no  Approach: anterior    Aspirate amount: 45 mL  Aspirate: serous and bloody    Patient tolerance: patient tolerated the procedure well with no immediate complications  Dressing:  Sterile dressing applied

## 2023-03-30 DIAGNOSIS — Z82.49 FAMILY HISTORY OF THORACIC AORTIC ANEURYSM: Primary | ICD-10-CM

## 2023-08-18 ENCOUNTER — HOSPITAL ENCOUNTER (OUTPATIENT)
Dept: NON INVASIVE DIAGNOSTICS | Facility: HOSPITAL | Age: 33
Discharge: HOME | End: 2023-08-18

## 2023-08-18 VITALS
HEIGHT: 74 IN | BODY MASS INDEX: 24.38 KG/M2 | WEIGHT: 190 LBS | HEART RATE: 54 BPM | SYSTOLIC BLOOD PRESSURE: 121 MMHG | DIASTOLIC BLOOD PRESSURE: 80 MMHG

## 2023-08-18 DIAGNOSIS — Z82.49 FAMILY HISTORY OF THORACIC AORTIC ANEURYSM: ICD-10-CM

## 2023-08-18 LAB
AORTIC ROOT: 3.7 CM
APICAL FOUR CHAMBER EJECTION FRACTION: 53 %
ASCENDING AORTA: 3 CM
E WAVE DECELERATION TIME: 221 MS
FRACTIONAL SHORTENING: 29 % (ref 28–44)
INTERVENTRICULAR SEPTUM IN DIASTOLE (PARASTERNAL SHORT AXIS VIEW): 0.9 CM
INTERVENTRICULAR SEPTUM: 0.9 CM (ref 0.6–1.1)
LAAS-AP2: 17.6 CM2
LAAS-AP4: 14.8 CM2
LEFT ATRIUM SIZE: 3 CM
LEFT ATRIUM VOLUME (MOD BIPLANE): 39 ML
LEFT INTERNAL DIMENSION IN SYSTOLE: 3.2 CM (ref 2.1–4)
LEFT VENTRICULAR INTERNAL DIMENSION IN DIASTOLE: 4.5 CM (ref 3.5–6)
LEFT VENTRICULAR POSTERIOR WALL IN END DIASTOLE: 1 CM
LEFT VENTRICULAR STROKE VOLUME: 49 ML
LVSV (TEICH): 49 ML
MV E'TISSUE VEL-SEP: 13 CM/S
MV PEAK A VEL: 0.42 M/S
MV PEAK E VEL: 72 CM/S
MV STENOSIS PRESSURE HALF TIME: 64 MS
MV VALVE AREA P 1/2 METHOD: 3.44 CM2
RIGHT ATRIAL 2D VOLUME: 36 ML
RIGHT ATRIUM AREA SYSTOLE A4C: 15.2 CM2
RIGHT VENTRICLE ID DIMENSION: 3.5 CM
SL CV LEFT ATRIUM LENGTH A2C: 5.1 CM
SL CV LV EF: 55
SL CV PED ECHO LEFT VENTRICLE DIASTOLIC VOLUME (MOD BIPLANE) 2D: 91 ML
SL CV PED ECHO LEFT VENTRICLE SYSTOLIC VOLUME (MOD BIPLANE) 2D: 42 ML
TR MAX PG: 11 MMHG
TR PEAK VELOCITY: 1.7 M/S
TRICUSPID ANNULAR PLANE SYSTOLIC EXCURSION: 2.8 CM
TRICUSPID VALVE PEAK REGURGITATION VELOCITY: 1.67 M/S

## 2023-08-18 PROCEDURE — 93306 TTE W/DOPPLER COMPLETE: CPT

## 2023-08-18 PROCEDURE — 93306 TTE W/DOPPLER COMPLETE: CPT | Performed by: INTERNAL MEDICINE

## (undated) DEVICE — GLOVE SRG BIOGEL 6.5

## (undated) DEVICE — GAUZE SPONGES,16 PLY: Brand: CURITY

## (undated) DEVICE — PLUMEPEN PRO 10FT

## (undated) DEVICE — DRAPE SHEET THREE QUARTER

## (undated) DEVICE — GLOVE INDICATOR PI UNDERGLOVE SZ 8.5 BLUE

## (undated) DEVICE — INTENDED FOR TISSUE SEPARATION, AND OTHER PROCEDURES THAT REQUIRE A SHARP SURGICAL BLADE TO PUNCTURE OR CUT.: Brand: BARD-PARKER ® CARBON RIB-BACK BLADES

## (undated) DEVICE — COBAN 6 IN STERILE

## (undated) DEVICE — GLOVE INDICATOR PI UNDERGLOVE SZ 8 BLUE

## (undated) DEVICE — CURITY NON-ADHERENT STRIPS: Brand: CURITY

## (undated) DEVICE — CHLORAPREP HI-LITE 26ML ORANGE

## (undated) DEVICE — GLOVE PI ULTRA TOUCH SZ.6.5

## (undated) DEVICE — GLOVE PI ULTRA TOUCH SZ.8.0

## (undated) DEVICE — SUT ETHILON 4-0 PS-2 18 IN 1667H

## (undated) DEVICE — SUT CHROMIC 5-0 P-3 18 IN 687G

## (undated) DEVICE — SCD SEQUENTIAL COMPRESSION COMFORT SLEEVE MEDIUM KNEE LENGTH: Brand: KENDALL SCD

## (undated) DEVICE — GLOVE INDICATOR PI UNDERGLOVE SZ 7 BLUE

## (undated) DEVICE — CAST PADDING 4 IN SYNTHETIC NON-STRL

## (undated) DEVICE — STERILE BETHLEHEM PLASTIC HAND: Brand: CARDINAL HEALTH

## (undated) DEVICE — 10FR FRAZIER SUCTION HANDLE: Brand: CARDINAL HEALTH

## (undated) DEVICE — CUFF TOURNIQUET 18 X 4 IN QUICK CONNECT DISP 1 BLADDER

## (undated) DEVICE — GLOVE INDICATOR PI UNDERGLOVE SZ 6.5 BLUE